# Patient Record
Sex: FEMALE | Race: WHITE | NOT HISPANIC OR LATINO | Employment: FULL TIME | URBAN - METROPOLITAN AREA
[De-identification: names, ages, dates, MRNs, and addresses within clinical notes are randomized per-mention and may not be internally consistent; named-entity substitution may affect disease eponyms.]

---

## 2020-11-13 ENCOUNTER — OFFICE VISIT (OUTPATIENT)
Dept: URGENT CARE | Facility: URGENT CARE | Age: 30
End: 2020-11-13
Payer: COMMERCIAL

## 2020-11-13 VITALS
HEART RATE: 82 BPM | OXYGEN SATURATION: 98 % | DIASTOLIC BLOOD PRESSURE: 74 MMHG | TEMPERATURE: 98.8 F | RESPIRATION RATE: 16 BRPM | SYSTOLIC BLOOD PRESSURE: 102 MMHG | WEIGHT: 151 LBS

## 2020-11-13 DIAGNOSIS — N89.8 VAGINAL DISCHARGE: Primary | ICD-10-CM

## 2020-11-13 LAB
ALBUMIN UR-MCNC: NEGATIVE MG/DL
APPEARANCE UR: CLEAR
BACTERIA #/AREA URNS HPF: ABNORMAL /HPF
BILIRUB UR QL STRIP: NEGATIVE
COLOR UR AUTO: YELLOW
GLUCOSE UR STRIP-MCNC: NEGATIVE MG/DL
HGB UR QL STRIP: ABNORMAL
KETONES UR STRIP-MCNC: NEGATIVE MG/DL
LEUKOCYTE ESTERASE UR QL STRIP: NEGATIVE
NITRATE UR QL: NEGATIVE
NON-SQ EPI CELLS #/AREA URNS LPF: ABNORMAL /LPF
PH UR STRIP: 6 PH (ref 5–7)
RBC #/AREA URNS AUTO: ABNORMAL /HPF
SOURCE: ABNORMAL
SP GR UR STRIP: 1.01 (ref 1–1.03)
SPECIMEN SOURCE: NORMAL
UROBILINOGEN UR STRIP-ACNC: 0.2 EU/DL (ref 0.2–1)
WBC #/AREA URNS AUTO: ABNORMAL /HPF
WET PREP SPEC: NORMAL

## 2020-11-13 PROCEDURE — 99203 OFFICE O/P NEW LOW 30 MIN: CPT | Performed by: PHYSICIAN ASSISTANT

## 2020-11-13 PROCEDURE — 87086 URINE CULTURE/COLONY COUNT: CPT | Performed by: PHYSICIAN ASSISTANT

## 2020-11-13 PROCEDURE — 81001 URINALYSIS AUTO W/SCOPE: CPT | Performed by: PHYSICIAN ASSISTANT

## 2020-11-13 PROCEDURE — 87210 SMEAR WET MOUNT SALINE/INK: CPT | Performed by: PHYSICIAN ASSISTANT

## 2020-11-13 RX ORDER — DROSPIRENONE AND ETHINYL ESTRADIOL 0.02-3(28)
KIT ORAL
COMMUNITY
Start: 2020-10-29

## 2020-11-13 ASSESSMENT — ENCOUNTER SYMPTOMS
FEVER: 0
HEMATURIA: 0
NAUSEA: 0
VOMITING: 0
FLANK PAIN: 0
DIARRHEA: 0
CHILLS: 0
DYSURIA: 0

## 2020-11-13 NOTE — PATIENT INSTRUCTIONS
Wet prep and urine test are both negative today. Please keep monitoring symptoms and follow up if any worsening symptoms.

## 2020-11-13 NOTE — PROGRESS NOTES
SUBJECTIVE:   Orlando Aviles is a 30 year old female presenting with a chief complaint of   Chief Complaint   Patient presents with     Urgent Care     Ongoing for 2 Days     Vaginal Problem     Possible Yeast Infection-mild Itching-Thin discharge-Hx of BV-[place] aggravates Sx       She is a new patient of [facility].    GYN Complaint    Onset of symptoms was 1 day(s) ago.  Course of illness is same.    Severity mild  Current and Associated symptoms:  Mild thin vaginal discharge-- no smell, no odor, mild pelvic discomfort/pressure during and after sexual intercourse in the past couple days.   Denies dysuria, urinary frequency, flank pain, fever, chills, n/v/d.  Treatment measures tried include:  None  Sexually active: yes, single partner, contraception - oral contraceptives  Predisposing factors: none  No concerns for STD's  Hx of previous symptom: has had BV and yeast in the past        Review of Systems   Constitutional: Negative for chills and fever.   Gastrointestinal: Negative for diarrhea, nausea and vomiting.   Genitourinary: Positive for vaginal discharge (mild). Negative for dysuria, flank pain, hematuria and urgency.       History reviewed. No pertinent past medical history.  History reviewed. No pertinent family history.  Current Outpatient Medications   Medication Sig Dispense Refill     drospirenone-ethinyl estradiol ([place]) 3-0.02 MG tablet        Social History     Tobacco Use     Smoking status: Never Smoker     Smokeless tobacco: Never Used   Substance Use Topics     Alcohol use: Not on file       OBJECTIVE  /74 (BP Location: Right arm, Patient Position: Chair, Cuff Size: Adult Regular)   Pulse 82   Temp 98.8  F (37.1  C) (Tympanic)   Resp 16   Wt 68.5 kg (151 lb)   SpO2 98%     Physical Exam  Constitutional:       General: She is not in acute distress.     Appearance: She is well-developed.   HENT:      Head: Normocephalic and atraumatic.      Right Ear: External ear normal.      Left  Ear: External ear normal.   Eyes:      Conjunctiva/sclera: Conjunctivae normal.   Neck:      Musculoskeletal: Normal range of motion.   Cardiovascular:      Rate and Rhythm: Regular rhythm.      Heart sounds: Normal heart sounds.   Pulmonary:      Effort: Pulmonary effort is normal. No respiratory distress.      Breath sounds: Normal breath sounds.   Abdominal:      General: Bowel sounds are normal. There is no distension.      Palpations: Abdomen is soft.      Tenderness: There is no abdominal tenderness. There is no right CVA tenderness, left CVA tenderness or guarding.   Skin:     General: Skin is warm and dry.   Neurological:      Mental Status: She is alert.         Labs:  Results for orders placed or performed in visit on 11/13/20 (from the past 24 hour(s))   *UA reflex to Microscopic and Culture ([facility] (except [place] and [place])    Specimen: Midstream Urine   Result Value Ref Range    Color Urine Yellow     Appearance Urine Clear     Glucose Urine Negative NEG^Negative mg/dL    Bilirubin Urine Negative NEG^Negative    Ketones Urine Negative NEG^Negative mg/dL    Specific Gravity Urine 1.010 1.003 - 1.035    Blood Urine Trace (A) NEG^Negative    pH Urine 6.0 5.0 - 7.0 pH    Protein Albumin Urine Negative NEG^Negative mg/dL    Urobilinogen Urine 0.2 0.2 - 1.0 EU/dL    Nitrite Urine Negative NEG^Negative    Leukocyte Esterase Urine Negative NEG^Negative    Source Midstream Urine    Urine Microscopic   Result Value Ref Range    WBC Urine 0 - 5 OTO5^0 - 5 /HPF    RBC Urine O - 2 OTO2^O - 2 /HPF    Squamous Epithelial /LPF Urine Few FEW^Few /LPF    Bacteria Urine Few (A) NEG^Negative /HPF   Wet prep    Specimen: Vagina   Result Value Ref Range    Specimen Description Vagina     Wet Prep No Trichomonas seen     Wet Prep No clue cells seen     Wet Prep No yeast seen     Wet Prep Few  WBC'S seen              ASSESSMENT:      ICD-10-CM    1. Vaginal discharge  N89.8 *UA reflex to Microscopic  and Culture ([facility] (except [place] and [place])     Wet prep     Urine Microscopic     Urine Culture Aerobic Bacterial          PLAN:    Vaginal discharge: mild. Onset 1 day ago. Wet prep today is negative. UA is negative. Urine culture pending. Advised to keep monitoring symptoms. Follow up if any worsening symptoms. She agrees with the plan.     Followup:    If not improving or if condition worsens, follow up with your Primary Care Provider

## 2020-11-14 LAB
BACTERIA SPEC CULT: NORMAL
Lab: NORMAL
SPECIMEN SOURCE: NORMAL

## 2021-01-25 ENCOUNTER — AMBULATORY - HEALTHEAST (OUTPATIENT)
Dept: NURSING | Facility: CLINIC | Age: 31
End: 2021-01-25

## 2021-02-15 ENCOUNTER — AMBULATORY - HEALTHEAST (OUTPATIENT)
Dept: NURSING | Facility: CLINIC | Age: 31
End: 2021-02-15

## 2021-07-08 ENCOUNTER — HOSPITAL ENCOUNTER (EMERGENCY)
Facility: CLINIC | Age: 31
Discharge: HOME | End: 2021-07-09
Attending: EMERGENCY MEDICINE | Admitting: EMERGENCY MEDICINE
Payer: COMMERCIAL

## 2021-07-08 VITALS
HEART RATE: 69 BPM | OXYGEN SATURATION: 100 % | SYSTOLIC BLOOD PRESSURE: 135 MMHG | DIASTOLIC BLOOD PRESSURE: 83 MMHG | TEMPERATURE: 97.6 F

## 2021-07-08 DIAGNOSIS — S00.93XA CONTUSION OF HEAD, UNSPECIFIED PART OF HEAD, INITIAL ENCOUNTER: ICD-10-CM

## 2021-07-08 DIAGNOSIS — S01.81XA FACIAL LACERATION, INITIAL ENCOUNTER: ICD-10-CM

## 2021-07-08 PROCEDURE — 272N000047 HC ADHESIVE DERMABOND SKIN

## 2021-07-08 PROCEDURE — 99282 EMERGENCY DEPT VISIT SF MDM: CPT

## 2021-07-08 PROCEDURE — 12011 RPR F/E/E/N/L/M 2.5 CM/<: CPT

## 2021-07-08 ASSESSMENT — ENCOUNTER SYMPTOMS
WOUND: 1
VOMITING: 0
HEADACHES: 0
NAUSEA: 0

## 2021-07-09 NOTE — ED PROVIDER NOTES
History   Chief Complaint:  Head Injury     HPI   Orlando Aviles is a 30 year old female who presents with head injury. Orlando was playing with her dog tonight when it got excited and head bumped her in the head. She sustained a laceration across her right brow. Her father, who is a paramedic, suggested she come to the emergency department. She denies any loss of consciousness, any current headaches, nausea, or vomiting. Her last tetanus was in 2019 per [facility].     Review of Systems   Gastrointestinal: Negative for nausea and vomiting.   Skin: Positive for wound.   Neurological: Negative for syncope and headaches.   All other systems reviewed and are negative.        Allergies:  No known drug allergies    Medications:  Betty    Past Medical History:    The patient denies any significant past medical history.    Social History:  Arrives with a friend to the emergency department.     Physical Exam     Patient Vitals for the past 24 hrs:   BP Temp Temp src Pulse SpO2   07/08/21 2128 135/83 97.6  F (36.4  C) Temporal 69 100 %       Physical Exam  Constitutional:  Oriented to person, place, and time. Well appearing.   HENT:   Head:    Normocephalic. 2 cm simple horizontal laceration across her right upper lateral eyebrow. TM's are clear.   Mouth/Throat:   Oropharynx is clear and moist.   Eyes:    EOM are normal. Pupils are equal, round, and reactive to light.   Neck:    Neck supple.   Musculoskeletal:  Normal range of motion. No c-spine tenderness.   Neurological:   Alert and oriented to person, place, and time. GCS 15.           Moves all 4 extremities spontaneously    Skin:    No rash noted. No pallor. Face laceration.     Emergency Department Course     Procedures    Laceration Repair        LACERATION:  A simple minimally Contaminated 2 cm laceration.      LOCATION:  Right Upper Lateral Eyebrow      FUNCTION:  Distally sensation and circulation are intact.      PREPARATION:  Irrigation with Normal  Saline      DEBRIDEMENT:  no debridement      CLOSURE:  Wound was closed with Dermabond    Emergency Department Course:    Reviewed:  I reviewed nursing notes, vitals and past medical history    Assessments:  2332 I obtained history and examined the patient as noted above.   2352 I rechecked the patient and repaired the laceration.     Disposition:  The patient was discharged to home.     Impression & Plan     Medical Decision Making:  Orlando Aviles is a 30 year old female who presents for evaluation of a laceration to the forehead. By the [place] head CT rules the patient does not warrant head CT evaluation and I believe she is at very low risk for skull fracture or intracerebral bleeding. Concussion is likewise of very low probability with no loss of consciousness and normal mental status here. Cervical spine is cleared clinically. The head to toe trauma is exam is negative otherwise and further trauma workup is not necessary.   The wound was carefully evaluated and explored. The laceration was closed with dermabond as noted above. There is no evidence of muscular, or bony damage with this laceration. No signs of foreign body. Possible complications (infection, scarring) were reviewed with the patient. Follow up with primary care will be indicated as noted in the discharge section.    Diagnosis:    ICD-10-CM    1. Contusion of head, unspecified part of head, initial encounter  S00.93XA    2. Facial laceration, initial encounter  S01.81XA        Scribe Disclosure:  I, Shayne Sanon, am serving as a scribe at 11:37 PM on 7/8/2021 to document services personally performed by Danie Elder MD based on my observations and the provider's statements to me.              Danie Elder MD  07/10/21 1222

## 2021-08-15 ENCOUNTER — HEALTH MAINTENANCE LETTER (OUTPATIENT)
Age: 31
End: 2021-08-15

## 2021-10-11 ENCOUNTER — HEALTH MAINTENANCE LETTER (OUTPATIENT)
Age: 31
End: 2021-10-11

## 2022-09-24 ENCOUNTER — HEALTH MAINTENANCE LETTER (OUTPATIENT)
Age: 32
End: 2022-09-24

## 2023-10-14 ENCOUNTER — HEALTH MAINTENANCE LETTER (OUTPATIENT)
Age: 33
End: 2023-10-14